# Patient Record
Sex: FEMALE | Race: BLACK OR AFRICAN AMERICAN | Employment: OTHER | URBAN - NONMETROPOLITAN AREA
[De-identification: names, ages, dates, MRNs, and addresses within clinical notes are randomized per-mention and may not be internally consistent; named-entity substitution may affect disease eponyms.]

---

## 2023-10-11 ENCOUNTER — HOSPITAL ENCOUNTER (OUTPATIENT)
Facility: HOSPITAL | Age: 73
Setting detail: RECURRING SERIES
Discharge: HOME | End: 2023-10-14
Payer: MEDICARE

## 2023-10-11 PROCEDURE — 97110 THERAPEUTIC EXERCISES: CPT

## 2023-10-11 PROCEDURE — 97161 PT EVAL LOW COMPLEX 20 MIN: CPT

## 2023-10-11 NOTE — THERAPY EVALUATION
Signature:_________________________   DATE:_________   TIME:________                           Griffin Blackwell Sr*    ** Signature, Date and Time must be completed for valid certification **  Please sign and fax to [phone].   Thank you      Patient Name:  Gino Bass :  1950

## 2023-10-16 ENCOUNTER — HOSPITAL ENCOUNTER (OUTPATIENT)
Facility: HOSPITAL | Age: 73
Setting detail: RECURRING SERIES
Discharge: HOME | End: 2023-10-19
Payer: MEDICARE

## 2023-10-16 PROCEDURE — G0283 ELEC STIM OTHER THAN WOUND: HCPCS

## 2023-10-16 PROCEDURE — 97110 THERAPEUTIC EXERCISES: CPT

## 2023-10-16 NOTE — PROGRESS NOTES
Other:  []w/US   []w/ice   [x]w/heat  Position: seated   Location:              min []  Mechanical Traction,        []  Cervical      []  Lumbar        []  Prone         []  Supine           []  Intermitt. []  Cont. Lbs:    pounds  [] With heat  [] Simultaneously performed with E-Stim    min []  Ultrasound,    []Continuous   [] Pulsed  []1MHz   []3MHz Location:  W/cm2:    min  Unbilled []  Ice     []  Heat             Position:  Location:            min []  Vasopneumatic Device,      pre-treatment girth :  cm   post-treatment girth : cm   measured at (landmark       location) :  Pressure:       [] lo [] med [] hi   Temperature: [] lo [] med [] Hi    [] With ice    [] Simultaneously performed with Estim     Skin assessment post-treatment (if applicable):    [x]  intact    [x]  redness- no adverse reaction []redness - adverse reaction:        [x] Patient Education billed concurrently with other procedures   [x] Review HEP    [] Progressed/Changed HEP, detail:    [] Other:       Other Objective/Functional Measures  Pt had increased discomfort today with shoulder retractions and was coached to ensure the scapulae stay depressed throughout the process    Pain Level at end of session (0-10 scale): 0/10    Assessment   Patient tolerated treatment well. She did have some increased numbness and tingling with certain exercises and required lots of verbal and occasional tactile cues in order to correct to proper technique. Pt reported excellent symptoms control after the treatment session. Continue POC progressing as able.   Patient will continue to benefit from skilled PT services to modify and progress therapeutic interventions, analyze and address functional mobility deficits, analyze and address ROM deficits, analyze and address strength deficits, analyze and address soft tissue restrictions, analyze and cue for proper movement patterns, and analyze and modify for postural abnormalities to address functional

## 2023-10-19 ENCOUNTER — HOSPITAL ENCOUNTER (OUTPATIENT)
Facility: HOSPITAL | Age: 73
Setting detail: RECURRING SERIES
Discharge: HOME | End: 2023-10-22
Payer: MEDICARE

## 2023-10-19 PROCEDURE — G0283 ELEC STIM OTHER THAN WOUND: HCPCS

## 2023-10-19 PROCEDURE — 97110 THERAPEUTIC EXERCISES: CPT

## 2023-10-19 NOTE — PROGRESS NOTES
to be met within 6 treatments:  Patient will demonstrate independence and compliance with HEP in order to increase mobility and assist with carryover from PT services. Status at last Eval/Progress Note: HEP  Current Status: provided HEP  Goal Met:  Yes     2. Patient will be able to sleep at least 4 hours at night with less shoulder pain of  5/10. Status at last Eval/Progress Note: Intermittent pain  Current Status: Pt continues to report pain levels at 5/10  Goal Met:  No      Long Term Goals, to be met within 12 treatments: 1. Patient will be able to perform daily activities with decrease numbness and tingling sensation of the left UE. Status at last Eval/Progress Note: intermittent  Current Status: pt continues the same severity of numbness and tingling  Goal Met:  No     2. Patient will be able to sleep at least 6 hours at night with less shoulder pain of  3/10.   Status at last Eval/Progress Note: Intermittent pain  Current Status: shoulder pain currently at 5/10  Goal Met:  No     []  See Progress Note/Recertification  []  See Discharge Summary    PLAN  [x]  Continue plan of care  [x]  Upgrade activities as tolerated  []  Discharge due to :  []  Other:      Fátima Allen, PT,        10/19/2023       9:45 AM

## 2023-10-24 ENCOUNTER — APPOINTMENT (OUTPATIENT)
Facility: HOSPITAL | Age: 73
End: 2023-10-24
Payer: MEDICARE

## 2023-10-26 ENCOUNTER — APPOINTMENT (OUTPATIENT)
Facility: HOSPITAL | Age: 73
End: 2023-10-26
Payer: MEDICARE

## 2023-11-03 ENCOUNTER — HOSPITAL ENCOUNTER (OUTPATIENT)
Facility: HOSPITAL | Age: 73
Setting detail: RECURRING SERIES
Discharge: HOME | End: 2023-11-06
Payer: MEDICARE

## 2023-11-03 PROCEDURE — 97110 THERAPEUTIC EXERCISES: CPT

## 2023-11-03 PROCEDURE — G0283 ELEC STIM OTHER THAN WOUND: HCPCS

## 2023-11-03 NOTE — PROGRESS NOTES
PHYSICAL THERAPY - MEDICARE DAILY TREATMENT NOTE (updated 3/23)    Date of Service: 11/3/2023        Patient Name:  Tigist Dykes :  1950   Medical   Diagnosis:  Spinal stenosis, cervical region [M48.02] Treatment Diagnosis:  M54.2  NECK PAIN    Referral Source: Luis Sharma Insurance:   Payor: MEDICARE / Plan: MEDICARE PART A AND B / Product Type: *No Product type* /    [x] Patient's date of birth verified                      Visit #   Current  / Total 4 10   Time   In / Out 1055 1155   Total Treatment Time (in minutes) 60    Total Timed Codes (in minutes) 50    1:1 Treatment Time (in minutes) 48       [facility] Totals Reminder:  bill using total billable   min of TIMED therapeutic procedures and modalities. 8-22 min = 1 unit; 23-37 min = 2 units; 38-52 min = 3 units; 53-67 min = 4 units; 68-82 min = 5 units        SUBJECTIVE  Pain Level (0-10 scale): 3/10    Any medication changes, allergies to medications, adverse drug reactions, diagnosis change, or new procedure performed?: [x] No    [] Yes (see summary sheet for update)  Medications: [x] Verified on Patient Summary List    Subjective functional status/changes:     \"I am alright today. \"    OBJECTIVE  Therapeutic Procedures: Tx Min Billable or 1:1 Min (if diff from Tx Min) Procedure, Rationale, Specifics   50  08476 Therapeutic Exercise (timed):  increase ROM, strength, coordination, balance, and proprioception to improve patient's ability to progress to PLOF and address remaining functional goals. (see flow sheet as applicable)   50     Total Total     Modalities Rationale:     decrease pain and increase tissue extensibility to improve patient's ability to progress to PLOF and address remaining functional goals.     10   min [x] Estim Unattended,             []  NMES  [] PreMod       [x]  IFC  [] Other:  []w/US   []w/ice   [x]w/heat  Position: seated  Location: neck R side            min []  Mechanical Traction,        []  Cervical

## 2023-11-08 ENCOUNTER — HOSPITAL ENCOUNTER (OUTPATIENT)
Facility: HOSPITAL | Age: 73
Setting detail: RECURRING SERIES
Discharge: HOME | End: 2023-11-11
Payer: MEDICARE

## 2023-11-08 PROCEDURE — 97110 THERAPEUTIC EXERCISES: CPT

## 2023-11-08 PROCEDURE — G0283 ELEC STIM OTHER THAN WOUND: HCPCS

## 2023-11-08 NOTE — PROGRESS NOTES
PHYSICAL THERAPY - MEDICARE DAILY TREATMENT NOTE (updated 3/23)    Date of Service: 2023        Patient Name:  Jez Savage :  1950   Medical   Diagnosis:  Spinal stenosis, cervical region [M48.02] Treatment Diagnosis:  M54.2, G89.29  CHRONIC NECK PAIN    Referral Source: [id number]:   Payor: MEDICARE / Plan: MEDICARE PART A AND B / Product Type: *No Product type* /    [x] Patient's date of birth verified                      Visit #   Current  / Total 5 12   Time   In / Out 10:00 10:45   Total Treatment Time (in minutes) 45    Total Timed Codes (in minutes) 30    1:1 Treatment Time (in minutes) 30       [facility] Totals Reminder:  bill using total billable   min of TIMED therapeutic procedures and modalities. 8-22 min = 1 unit; 23-37 min = 2 units; 38-52 min = 3 units; 53-67 min = 4 units; 68-82 min = 5 units        SUBJECTIVE  Pain Level (0-10 scale): 0/10    Any medication changes, allergies to medications, adverse drug reactions, diagnosis change, or new procedure performed?: [x] No    [] Yes (see summary sheet for update)  Medications: [x] Verified on Patient Summary List    Subjective functional status/changes:     \"I have been doing the neck stretches since I have been gone. Continue to get numbness and tingling sensation of both arms based on position. \"    OBJECTIVE  Therapeutic Procedures: Tx Min Billable or 1:1 Min (if diff from Tx Min) Procedure, Rationale, Specifics   30 30 21369 Therapeutic Exercise (timed):  increase ROM, strength, coordination, balance, and proprioception to improve patient's ability to progress to PLOF and address remaining functional goals. (see flow sheet as applicable)                   30 30    Total Total     Modalities Rationale:     decrease inflammation, decrease pain, and increase tissue extensibility to improve patient's ability to progress to PLOF and address remaining functional goals.     15   min [x] Estim Unattended,

## 2023-11-10 ENCOUNTER — HOSPITAL ENCOUNTER (OUTPATIENT)
Facility: HOSPITAL | Age: 73
Setting detail: RECURRING SERIES
Discharge: HOME | End: 2023-11-13
Payer: MEDICARE

## 2023-11-10 PROCEDURE — 97110 THERAPEUTIC EXERCISES: CPT

## 2023-11-10 PROCEDURE — 97150 GROUP THERAPEUTIC PROCEDURES: CPT

## 2023-11-10 NOTE — PROGRESS NOTES
PHYSICAL THERAPY - MEDICARE DAILY TREATMENT NOTE (updated 3/23)    Date of Service: 11/10/2023        Patient Name:  Tami Zavaleta :  1950   Medical   Diagnosis:  Spinal stenosis, cervical region [M48.02] Treatment Diagnosis:  M54.2  NECK PAIN    Referral Source: Kellen,  [address]:   Payor: MEDICARE / Plan: MEDICARE PART A AND B / Product Type: *No Product type* /    [x] Patient's date of birth verified                      Visit #   Current  / Total 6 10   Time   In / Out 1033 1116   Total Treatment Time (in minutes) 43    Total Timed Codes (in minutes) 21    1:1 Treatment Time (in minutes) 21       [facility] Totals Reminder:  bill using total billable   min of TIMED therapeutic procedures and modalities. 8-22 min = 1 unit; 23-37 min = 2 units; 38-52 min = 3 units; 53-67 min = 4 units; 68-82 min = 5 units        SUBJECTIVE  Pain Level (0-10 scale): 0/10    Any medication changes, allergies to medications, adverse drug reactions, diagnosis change, or new procedure performed?: [x] No    [] Yes (see summary sheet for update)  Medications: [x] Verified on Patient Summary List    Subjective functional status/changes:     \"I am making progress. \"    OBJECTIVE  Therapeutic Procedures: Tx Min Billable or 1:1 Min (if diff from Tx Min) Procedure, Rationale, Specifics   21 21 89030 Therapeutic Exercise (timed):  increase ROM, strength, coordination, balance, and proprioception to improve patient's ability to progress to PLOF and address remaining functional goals. (see flow sheet as applicable)   19524 Group Therapy (untimed): Billed while completing therapeutic exercise during beginning of treatment session to improve patient's ability to progress to PLOF and address remaining functional goals.   (see flow sheet as applicable)   43 21    Total Total       [x] Patient Education billed concurrently with other procedures   [x] Review HEP    [] Progressed/Changed HEP, detail:    [] Other:
HEP  Current Status: provided HEP  Goal Met:  Yes     2. Patient will be able to sleep at least 4 hours at night with less shoulder pain of  5/10. Status at last Eval/Progress Note: Intermittent pain  Current Status: 3/10 up to x2/wk   Goal Met:  yes      Long Term Goals, to be met within 12 treatments: 1. Patient will be able to perform daily activities with decrease numbness and tingling sensation of the left UE. Status at last Eval/Progress Note: intermittent  Current Status: no numbness or tingling  Goal Met:  Yes     2. Patient will be able to sleep at least 6 hours at night with less shoulder pain of  3/10. Status at last Eval/Progress Note: Intermittent pain  Current Status: 3/10 x2/wk   Goal Met:  In Progress    3. Patient will be able to reach into the upper cabinets to put away the dishes with less than 2/10 pain. Status at last Eval/Progress Note: avoiding tasks  Current Status: new goal 11/10/2023  Goal Met: No        Frequency/Duration:  2 treatments per week, for 10 treatments. RECOMMENDATIONS  [x]  Continue plan of care  [x]  Upgrade activities as tolerated    Certification Period:  11/10/2023 to 01/08/2024      Abelino Reno PT, DPT       11/10/2023       1:22 PM    ________________________________________________________________________  NOTE TO PHYSICIAN:  Please complete the following and fax to:  Located within [facility]:   Fax: [phone] this original for your records. If you are unable to process this request in 24 hours, please contact our office.        ____ I have read the above report and request that my patient continue therapy with the following changes/special instructions:  ____ I have read the above report and request that my patient be discharged from therapy    Physician's Signature:______________________________________ Date:___________Time:__________                              Rojas Kate    ** Signature, Date and Time must be

## 2024-10-07 ENCOUNTER — PROCEDURE VISIT (OUTPATIENT)
Age: 74
End: 2024-10-07
Payer: MEDICARE

## 2024-10-07 ENCOUNTER — OFFICE VISIT (OUTPATIENT)
Age: 74
End: 2024-10-07
Payer: MEDICARE

## 2024-10-07 VITALS
HEART RATE: 77 BPM | RESPIRATION RATE: 16 BRPM | WEIGHT: 216 LBS | OXYGEN SATURATION: 98 % | HEIGHT: 63 IN | BODY MASS INDEX: 38.27 KG/M2 | DIASTOLIC BLOOD PRESSURE: 68 MMHG | SYSTOLIC BLOOD PRESSURE: 104 MMHG

## 2024-10-07 DIAGNOSIS — H93.13 BILATERAL TINNITUS: ICD-10-CM

## 2024-10-07 DIAGNOSIS — H90.3 SENSORINEURAL HEARING LOSS (SNHL), BILATERAL: ICD-10-CM

## 2024-10-07 DIAGNOSIS — H90.3 SENSORINEURAL HEARING LOSS (SNHL), BILATERAL: Primary | ICD-10-CM

## 2024-10-07 DIAGNOSIS — H91.93 DECREASED HEARING, BILATERAL: Primary | ICD-10-CM

## 2024-10-07 DIAGNOSIS — H93.13 TINNITUS, BILATERAL: ICD-10-CM

## 2024-10-07 PROCEDURE — 99203 OFFICE O/P NEW LOW 30 MIN: CPT | Performed by: OTOLARYNGOLOGY

## 2024-10-07 PROCEDURE — 3074F SYST BP LT 130 MM HG: CPT | Performed by: OTOLARYNGOLOGY

## 2024-10-07 PROCEDURE — 3078F DIAST BP <80 MM HG: CPT | Performed by: OTOLARYNGOLOGY

## 2024-10-07 PROCEDURE — 92557 COMPREHENSIVE HEARING TEST: CPT

## 2024-10-07 PROCEDURE — 92567 TYMPANOMETRY: CPT

## 2024-10-07 PROCEDURE — 1123F ACP DISCUSS/DSCN MKR DOCD: CPT | Performed by: OTOLARYNGOLOGY

## 2024-10-07 RX ORDER — CARVEDILOL 6.25 MG/1
TABLET ORAL
COMMUNITY
Start: 2023-12-06

## 2024-10-07 RX ORDER — MEMANTINE HYDROCHLORIDE 10 MG/1
TABLET ORAL
COMMUNITY
Start: 2023-11-13

## 2024-10-07 RX ORDER — LISINOPRIL 2.5 MG/1
TABLET ORAL
COMMUNITY
Start: 2023-12-06

## 2024-10-07 RX ORDER — SPIRONOLACTONE 25 MG/1
TABLET ORAL
COMMUNITY
Start: 2023-12-06

## 2024-10-07 RX ORDER — ATORVASTATIN CALCIUM 20 MG/1
TABLET, FILM COATED ORAL
COMMUNITY

## 2024-10-07 RX ORDER — FUROSEMIDE 40 MG
TABLET ORAL
COMMUNITY

## 2024-10-07 NOTE — PROGRESS NOTES
- XI intact. Normal gait        Assessment and Plan:   Bilateral sensorineural hearing loss  Bilateral tinnitus  -Reviewed audiogram results with patient showing mild high-frequency sensorineural hearing loss  -Discussed tinnitus and management strategies  -Discussed role of hearing loss and tinnitus  -Discussed role of hearing aids for tinnitus and hearing loss management  -She will consider the above  -Would otherwise update audiogram in 1 to 2 years or pending her symptoms        The patient was instructed to return to clinic if no improvement or progression of symptoms. Signs to watch out for reviewed.      Mary Daniel MD   [facility]  [address] [place] [address] [address]  [place] [address]  Office Phone: [phone]

## 2024-10-07 NOTE — PROGRESS NOTES
Becka Kirk   1950, 74 y.o. female   [id number]       Referring Provider: Mary Daniel MD  Referral Type: In an order in Epic    Reason for Visit: Evaluation of the cause of disorders of hearing, tinnitus, or balance.    ADULT AUDIOLOGIC EVALUATION      Becka Kirk is a 74 y.o. female seen today, 10/7/2024 , for an initial audiologic evaluation.  Patient was seen by Mary Daniel MD following today's evaluation.    AUDIOLOGIC AND OTHER PERTINENT MEDICAL HISTORY:      Becka Kirk reports bilateral tinnitus. Reports bilateral tinnitus, onsetting gradually over the past 2 months. She notes no hearing difficulties today.    She denied otalgia, aural fullness, otorrhea, dizziness, imbalance, history of noise exposure, and family history of hearing loss    Date: 10/7/2024     IMPRESSIONS:      Today's results revealed normal sloping to mild sensorineural hearing loss. Excellent speech understanding when in quiet. Tympanometry indicates normal middle ear function. Discussed test results and implications with patient. Discussed possible benefits of amplification.  Discussed use of tinnitus management strategies. Provided basic tinnitus education, including the neurophysiological model.    Follow medical recommendations of Mary Daniel MD.     ASSESSMENT AND FINDINGS:     Otoscopy unremarkable.    RIGHT EAR:  Hearing Sensitivity: Normal sloping to mild sensorineural hearing loss  Speech Recognition Threshold: 25 dB HL  Word Recognition: Excellent 100%, based on NU-6 by-difficulty list at 65 dBHL using recorded speech stimuli.    Tympanometry: Normal peak pressure and compliance, Type A tympanogram, consistent with normal middle ear function.       LEFT EAR:  Hearing Sensitivity: Normal sloping to mild sensorineural hearing loss  Speech Recognition Threshold: 20 dB HL  Word Recognition: Excellent 100%, based on NU-6 by-difficulty list at 65 dBHL using recorded speech stimuli.    Tympanometry: Normal peak

## 2024-10-16 ENCOUNTER — APPOINTMENT (OUTPATIENT)
Facility: HOSPITAL | Age: 74
End: 2024-10-16
Payer: MEDICARE

## 2024-10-21 ENCOUNTER — HOSPITAL ENCOUNTER (OUTPATIENT)
Facility: HOSPITAL | Age: 74
Setting detail: RECURRING SERIES
Discharge: HOME | End: 2024-10-24
Payer: MEDICARE

## 2024-10-21 PROCEDURE — 97161 PT EVAL LOW COMPLEX 20 MIN: CPT

## 2024-10-21 NOTE — THERAPY EVALUATION
[facility]  [address] [place] [id number]  Ph: [phone]     Fax: [phone]         PHYSICAL THERAPY - MEDICARE EVALUATION/PLAN OF CARE NOTE (updated 3/23)      Date of Service: 10/21/2024          Patient Name:  Becka Kirk :  1950   Medical   Diagnosis:  Spinal stenosis, cervical region [M48.02] Treatment Diagnosis:  M54.2  NECK PAIN    Referral Source:  Be Foreman * Provider #:  [id number]                Insurance: Payor: MEDICARE / Plan: MEDICARE PART A AND B / Product Type: *No Product type* /    [x] Patient's date of birth verified      Visit #   Current  / Total 1    Time   In / Out 1335 1400   Total Treatment Time 25 minutes   Total Timed Codes 0 minutes   1:1 Treatment Time 0 minutes      [facility] Totals Reminder:  bill using total billable   min of TIMED therapeutic procedures and modalities.   8-22 min = 1 unit; 23-37 min = 2 units; 38-52 min = 3 units;  53-67 min = 4 units; 68-82 min = 5 units       SUBJECTIVE  Pain Level (0-10 scale): 6/10  Changes in pain since onset: Intermittent    Any medication changes, allergies to medications, adverse drug reactions, diagnosis change, or new procedure performed?: [x] No    [] Yes (see summary sheet for update)  Medications: Verified on Patient Summary List    Subjective functional status/changes:     Patient is 74 y.o. -American female who presents for physical therapy evaluation with neck pain that has been present for ~ 2 weeks. She reports that she just woke up with pain that radiates into her LUE. She has tingling into the arm, but pain stays at the neck. She has difficulty going to sleep, but stays away.     Onset Date: 2 weeks ago  Start of Care: 10/21/2024  PLOF: Independent with all ADL's   Mechanism of Injury: Insidious onset  Previous Treatment/Compliance: Outpatient   Radiographs: None taken  What increases symptoms: reaching, lifting  What decreases symptoms: sitting

## 2024-10-23 ENCOUNTER — HOSPITAL ENCOUNTER (OUTPATIENT)
Facility: HOSPITAL | Age: 74
Setting detail: RECURRING SERIES
Discharge: HOME | End: 2024-10-26
Payer: MEDICARE

## 2024-10-23 PROCEDURE — 97110 THERAPEUTIC EXERCISES: CPT

## 2024-10-23 NOTE — PROGRESS NOTES
[]  Lumbar        []  Prone         []  Supine           []  Intermitt.     []  Cont.     Lbs:    pounds  [] With heat  [] Simultaneously performed with E-Stim    min []  Ultrasound,    []Continuous   [] Pulsed  []1MHz   []3MHz Location:  W/cm2:   10 min  Unbilled []  Ice     [x]  Heat             Position: seated  Location: neck            min []  Vasopneumatic Device,      pre-treatment girth :  cm   post-treatment girth : cm   measured at (landmark       location) :  Pressure:       [] lo [] med [] hi   Temperature: [] lo [] med [] Hi    [] With ice    [] Simultaneously performed with Estim     Skin assessment post-treatment (if applicable):    [x]  intact    [x]  redness- no adverse reaction []redness - adverse reaction:        Pain Level at end of session (0-10 scale): 1/10    Assessment   Patient tolerated treatment fair. Pt was able to complete all asked exercises and stretches today without c/o increased neck pain. However, pt c/o more sharp hip pain than anything. Patient will continue to benefit from skilled PT services to modify and progress therapeutic interventions, analyze and address functional mobility deficits, analyze and address ROM deficits, and analyze and address strength deficits to address functional deficits and attain remaining goals.    PRECAUTIONS:  History of Cancer        Date of Initial Evaluation: 10/21/2024  Date of last Progress Note: n/a  Visit # of last Progress Note: 1      Number of Insurance Authorized visits: Not Applicable            Therapeutic Exercise Flow Sheet:                                                                                    Running Visit #    EXERCISE   1   2   3   4   5   6   7   8   9   10      UBE    bkwd  Lvl 2 x12'                            Cervical AROM          x10                         Upper Trap Stretch          30\"X3                         LS Stretch          30\"X3                         Chin Tucks          5\"x10

## 2024-10-28 ENCOUNTER — HOSPITAL ENCOUNTER (OUTPATIENT)
Facility: HOSPITAL | Age: 74
Setting detail: RECURRING SERIES
Discharge: HOME | End: 2024-10-31
Payer: MEDICARE

## 2024-10-28 PROCEDURE — 97110 THERAPEUTIC EXERCISES: CPT

## 2024-10-28 NOTE — PROGRESS NOTES
PHYSICAL THERAPY - MEDICARE DAILY TREATMENT NOTE (updated 3/23)    Date of Service: 10/28/2024        Patient Name:  Becka Kirk :  1950   Medical   Diagnosis:  Spinal stenosis, cervical region [M48.02] Treatment Diagnosis:  M54.2  NECK PAIN    Referral Source:  Be Foreman Sr* Insurance:   Payor: MEDICARE / Plan: MEDICARE PART A AND B / Product Type: *No Product type* /    [x] Patient's date of birth verified                      Visit #   Current  / Total 3 10   Time   In / Out 1007 1008   Total Treatment Time (in minutes) 61   Total Timed Codes (in minutes) 51   1:1 Treatment Time (in minutes) 51      [facility] Totals Reminder:  bill using total billable   min of TIMED therapeutic procedures and modalities.   8-22 min = 1 unit; 23-37 min = 2 units; 38-52 min = 3 units; 53-67 min = 4 units; 68-82 min = 5 units        SUBJECTIVE  Pain Level (0-10 scale): 0/10    Any medication changes, allergies to medications, adverse drug reactions, diagnosis change, or new procedure performed?: No  Medications: [x] Verified on Patient Summary List    Subjective functional status/changes:     \"The frequency of the pain is lessening since I started physical therapy.\"    OBJECTIVE  Therapeutic Procedures:  Tx Min Billable or 1:1 Min (if diff from Tx Min) Procedure, Rationale, Specifics   51  00677 Therapeutic Exercise (timed):  increase ROM, strength, coordination, balance, and proprioception to improve patient's ability to progress to PLOF and address remaining functional goals. (see flow sheet as applicable)   51     Total Total   [x] Patient Education billed concurrently with other procedures     Modalities Rationale:     decrease pain and increase tissue extensibility to improve patient's ability to progress to PLOF and address remaining functional goals.       min [] Estim Unattended,             []  NMES  [] PreMod       []  IFC  [] Other:  []w/US   []w/ice   []w/heat  Position:  Location:            min []

## 2024-10-30 ENCOUNTER — HOSPITAL ENCOUNTER (OUTPATIENT)
Facility: HOSPITAL | Age: 74
Setting detail: RECURRING SERIES
Discharge: HOME | End: 2024-11-02
Payer: MEDICARE

## 2024-10-30 PROCEDURE — 97110 THERAPEUTIC EXERCISES: CPT

## 2024-10-30 NOTE — PROGRESS NOTES
Lumbar        []  Prone         []  Supine           []  Intermitt.     []  Cont.     Lbs:    pounds  [] With heat  [] Simultaneously performed with E-Stim    min []  Ultrasound,    []Continuous   [] Pulsed  []1MHz   []3MHz Location:  W/cm2:   10 min  Unbilled []  Ice     [x]  Heat             Position: seated  Location: neck            min []  Vasopneumatic Device,      pre-treatment girth :  cm   post-treatment girth : cm   measured at (landmark       location) :  Pressure:       [] lo [] med [] hi   Temperature: [] lo [] med [] Hi    [] With ice    [] Simultaneously performed with Estim     Skin assessment post-treatment (if applicable):    [x]  intact    [x]  redness- no adverse reaction []redness - adverse reaction:        Pain Level at end of session (0-10 scale): 0/10    Assessment   Patient tolerated treatment well today. She did need some coaching to perform LS stretch correctly, but benefited well from the stretch. We will progress to some more strengthening in upcoming sessions as she tolerates. Patient will continue to benefit from skilled PT services to modify and progress therapeutic interventions, analyze and address functional mobility deficits, analyze and address ROM deficits, and analyze and address strength deficits to address functional deficits and attain remaining goals.    PRECAUTIONS:  History of Cancer        Date of Initial Evaluation: 10/21/2024  Date of last Progress Note: n/a  Visit # of last Progress Note: 1      Number of Insurance Authorized visits: Not Applicable            Therapeutic Exercise Flow Sheet:                                                                                    Running Visit #    EXERCISE   1   2   3   4   5   6   7   8   9   10      UBE    bkwd  Lvl 2 x12'      Lvl 2  10min    Lvl 2 x12'                   Cervical AROM          x10    x20     x20                   Upper Trap Stretch          30\"X3    30\"X3       30\"X3                    LS Stretch

## 2024-11-04 ENCOUNTER — HOSPITAL ENCOUNTER (OUTPATIENT)
Facility: HOSPITAL | Age: 74
Setting detail: RECURRING SERIES
End: 2024-11-04
Payer: MEDICARE

## 2024-11-06 ENCOUNTER — HOSPITAL ENCOUNTER (OUTPATIENT)
Facility: HOSPITAL | Age: 74
Setting detail: RECURRING SERIES
Discharge: HOME | End: 2024-11-09
Payer: MEDICARE

## 2024-11-06 PROCEDURE — 97110 THERAPEUTIC EXERCISES: CPT

## 2024-11-06 PROCEDURE — 97012 MECHANICAL TRACTION THERAPY: CPT

## 2024-11-06 PROCEDURE — 97150 GROUP THERAPEUTIC PROCEDURES: CPT

## 2024-11-06 NOTE — PROGRESS NOTES
PHYSICAL THERAPY - MEDICARE DAILY TREATMENT NOTE (updated 3/23)    Date of Service: 2024        Patient Name:  Becka Kirk :  1950   Medical   Diagnosis:  Spinal stenosis, cervical region [M48.02] Treatment Diagnosis:  M54.2  NECK PAIN    Referral Source:  Be Foreman Sr* Insurance:   Payor: MEDICARE / Plan: MEDICARE PART A AND B / Product Type: *No Product type* /    [x] Patient's date of birth verified                      Visit #   Current  / Total 5 10   Time   In / Out 1031 1133   Total Treatment Time (in minutes) 62    Total Timed Codes (in minutes) 34    1:1 Treatment Time (in minutes) 34       [facility] Totals Reminder:  bill using total billable   min of TIMED therapeutic procedures and modalities.   8-22 min = 1 unit; 23-37 min = 2 units; 38-52 min = 3 units; 53-67 min = 4 units; 68-82 min = 5 units        SUBJECTIVE  Pain Level (0-10 scale): 3/10    Any medication changes, allergies to medications, adverse drug reactions, diagnosis change, or new procedure performed?: No  Medications: [x] Verified on Patient Summary List    Subjective functional status/changes:     \"I've got tingling down my R arm to my thumb and index finger.\"    OBJECTIVE  Therapeutic Procedures:  Tx Min Billable or 1:1 Min (if diff from Tx Min) Procedure, Rationale, Specifics   34 34 [id number] Therapeutic Exercise (timed):  increase ROM, strength, coordination, balance, and proprioception to improve patient's ability to progress to PLOF and address remaining functional goals. (see flow sheet as applicable)   11 0 [id number] Group Therapy (untimed): Billed while completing therapeutic exercise during beginning of treatment session to improve patient's ability to progress to PLOF and address remaining functional goals.  (see flow sheet as applicable)   45 34    Total Total   [x] Patient Education billed concurrently with other procedures     Modalities Rationale:     decrease pain and increase tissue extensibility to

## 2024-11-13 ENCOUNTER — HOSPITAL ENCOUNTER (OUTPATIENT)
Facility: HOSPITAL | Age: 74
Setting detail: RECURRING SERIES
Discharge: HOME | End: 2024-11-16
Payer: MEDICARE

## 2024-11-13 PROCEDURE — G0283 ELEC STIM OTHER THAN WOUND: HCPCS

## 2024-11-13 PROCEDURE — 97012 MECHANICAL TRACTION THERAPY: CPT

## 2024-11-13 PROCEDURE — 97150 GROUP THERAPEUTIC PROCEDURES: CPT

## 2024-11-13 NOTE — PROGRESS NOTES
PHYSICAL THERAPY - MEDICARE DAILY TREATMENT NOTE (updated 3/23)    Date of Service: 2024        Patient Name:  Becka Kirk :  1950   Medical   Diagnosis:  Spinal stenosis, cervical region [M48.02] Treatment Diagnosis:  M54.2  NECK PAIN    Referral Source:  Be Foreman Sr* Insurance:   Payor: MEDICARE / Plan: MEDICARE PART A AND B / Product Type: *No Product type* /    [x] Patient's date of birth verified                      Visit #   Current  / Total 6 10   Time   In / Out 1001 am 1059 am    Total Treatment Time (in minutes) 58    Total Timed Codes (in minutes) 40    1:1 Treatment Time (in minutes) 0       [facility] Totals Reminder:  bill using total billable   min of TIMED therapeutic procedures and modalities.   8-22 min = 1 unit; 23-37 min = 2 units; 38-52 min = 3 units; 53-67 min = 4 units; 68-82 min = 5 units        SUBJECTIVE  Pain Level (0-10 scale): 2/10    Any medication changes, allergies to medications, adverse drug reactions, diagnosis change, or new procedure performed?: No  Medications: [x] Verified on Patient Summary List    Subjective functional status/changes:     \"Pt reports some numbness and tingling in her L hand , fingers and thumb.\"    OBJECTIVE  Therapeutic Procedures:  Tx Min Billable or 1:1 Min (if diff from Tx Min) Procedure, Rationale, Specifics     08977 Therapeutic Exercise (timed):  increase ROM, strength, coordination, balance, and proprioception to improve patient's ability to progress to PLOF and address remaining functional goals. (see flow sheet as applicable)   40 0 [id number] Group Therapy (untimed): Billed while completing therapeutic exercise during beginning of treatment session to improve patient's ability to progress to PLOF and address remaining functional goals.  (see flow sheet as applicable)   40 0    Total Total   [x] Patient Education billed concurrently with other procedures     Modalities Rationale:     decrease pain, increase tissue

## 2024-11-15 ENCOUNTER — HOSPITAL ENCOUNTER (OUTPATIENT)
Facility: HOSPITAL | Age: 74
Setting detail: RECURRING SERIES
Discharge: HOME | End: 2024-11-18
Payer: MEDICARE

## 2024-11-15 PROCEDURE — 97012 MECHANICAL TRACTION THERAPY: CPT

## 2024-11-15 PROCEDURE — 97110 THERAPEUTIC EXERCISES: CPT

## 2024-11-15 PROCEDURE — 97150 GROUP THERAPEUTIC PROCEDURES: CPT

## 2024-11-15 NOTE — PROGRESS NOTES
PHYSICAL THERAPY - MEDICARE DAILY TREATMENT NOTE (updated 3/23)    Date of Service: 11/15/2024        Patient Name:  Becka Kirk :  1950   Medical   Diagnosis:  Spinal stenosis, cervical region [M48.02] Treatment Diagnosis:  M54.2  NECK PAIN    Referral Source:  Be Foreman Sr* Insurance:   Payor: MEDICARE / Plan: MEDICARE PART A AND B / Product Type: *No Product type* /    [x] Patient's date of birth verified                      Visit #   Current  / Total 7 10   Time   In / Out 1025 am 1130 am   Total Treatment Time (in minutes) 65    Total Timed Codes (in minutes) 47    1:1 Treatment Time (in minutes) 37       [facility] Totals Reminder:  bill using total billable   min of TIMED therapeutic procedures and modalities.   8-22 min = 1 unit; 23-37 min = 2 units; 38-52 min = 3 units; 53-67 min = 4 units; 68-82 min = 5 units        SUBJECTIVE  Pain Level (0-10 scale): 7/10    Any medication changes, allergies to medications, adverse drug reactions, diagnosis change, or new procedure performed?: No  Medications: [x] Verified on Patient Summary List    Subjective functional status/changes:     \"Pt reports doing much better the traction seems to be helping.\"    OBJECTIVE  Therapeutic Procedures:  Tx Min Billable or 1:1 Min (if diff from Tx Min) Procedure, Rationale, Specifics   37 [phone] [id number] Therapeutic Exercise (timed):  increase ROM, strength, coordination, balance, and proprioception to improve patient's ability to progress to PLOF and address remaining functional goals. (see flow sheet as applicable)   10 0 10887 Group Therapy (untimed): Billed while completing therapeutic exercise during end of treatment session to improve patient's ability to progress to PLOF and address remaining functional goals.  (see flow sheet as applicable)   47 37    Total Total   [x] Patient Education billed concurrently with other procedures     Modalities Rationale:     decrease pain, increase tissue extensibility, and

## 2024-11-18 ENCOUNTER — HOSPITAL ENCOUNTER (OUTPATIENT)
Facility: HOSPITAL | Age: 74
Setting detail: RECURRING SERIES
Discharge: HOME | End: 2024-11-21
Payer: MEDICARE

## 2024-11-18 PROCEDURE — 97012 MECHANICAL TRACTION THERAPY: CPT

## 2024-11-18 PROCEDURE — 97110 THERAPEUTIC EXERCISES: CPT

## 2024-11-18 NOTE — PROGRESS NOTES
[facility]  [address] [place] [id number]  Ph: [phone]     Fax: [phone]    CONTINUED PLAN OF CARE/RECERTIFICATION FOR PHYSICAL THERAPY          Patient Name:              Becka Kirk :  1950   Treatment/Medical Diagnosis:  Spinal stenosis, cervical region [M48.02]   Onset Date:  10/2024    Referral Source:  Be Foreman Sr* Start of Care (SOC):  10/*   Prior Hospitalization:  See Medical History Provider #:  NPI      Prior Level of Function (PLOF):  independent   Comorbidities:  See Medical History   Medications:  Verified on Patient Summary List   Visits from SOC:  8 Missed Visits:  1       Summary of Care / Assessment / Recommendations:   Patient tolerated treatment well so far addressing neck and UE pain. She is still having some radicular symptoms into the LUE which does improve with use of traction to the cervical spine. She gets a couple days relief from traction, so we will plan to increase days between treatment sessions to try and increase carryover between sessions. She has demonstrated improvements in ROM and strength at this time. Patient will continue to benefit from skilled PT services x2/ week to modify and progress therapeutic interventions, analyze and address functional mobility deficits, analyze and address ROM deficits, analyze and address strength deficits, and analyze and address soft tissue restrictions to address functional deficits and attain remaining goals.      Progress Toward Goals:   Short Term Goals, to be met within 5 treatments:  Patient will demonstrate independence and compliance with HEP in order to increase mobility and assist with carryover from PT services.  Status at last Eval/Progress Note: provided  Current Status: performing  Goal Met?  Yes     2.  Patient will improve cervical ROT to 60 deg B to be able to take in her surrounds with less limitations.   Status at last Eval/Progress Note:

## 2024-11-18 NOTE — PROGRESS NOTES
PHYSICAL THERAPY - MEDICARE DAILY TREATMENT NOTE (updated 3/23)    Date of Service: 2024        Patient Name:  Becka Kirk :  1950   Medical   Diagnosis:  Spinal stenosis, cervical region [M48.02] Treatment Diagnosis:  M54.2  NECK PAIN    Referral Source:  Be Foreman Sr* Insurance:   Payor: MEDICARE / Plan: MEDICARE PART A AND B / Product Type: *No Product type* /    [x] Patient's date of birth verified                      Visit #   Current  / Total 8 10   Time   In / Out 1000 1100   Total Treatment Time (in minutes) 60    Total Timed Codes (in minutes) 40   1:1 Treatment Time (in minutes) 40       [facility] Totals Reminder:  bill using total billable   min of TIMED therapeutic procedures and modalities.   8-22 min = 1 unit; 23-37 min = 2 units; 38-52 min = 3 units; 53-67 min = 4 units; 68-82 min = 5 units        SUBJECTIVE  Pain Level (0-10 scale): 3/10    Any medication changes, allergies to medications, adverse drug reactions, diagnosis change, or new procedure performed?: No  Medications: [x] Verified on Patient Summary List    Subjective functional status/changes:     \"I still have some pain and numbness down to my first few fingers.\"    OBJECTIVE  Therapeutic Procedures:  Tx Min Billable or 1:1 Min (if diff from Tx Min) Procedure, Rationale, Specifics   40  [id number] Therapeutic Exercise (timed):  increase ROM, strength, coordination, balance, and proprioception to improve patient's ability to progress to PLOF and address remaining functional goals. (see flow sheet as applicable)   40     Total Total   [x] Patient Education billed concurrently with other procedures     Modalities Rationale:     decrease pain, increase tissue extensibility, and increase muscle contraction/control to improve patient's ability to progress to PLOF and address remaining functional goals.       min [] Estim Unattended,             []  NMES  [] PreMod       []  IFC  [] Other:  []w/US   []w/ice

## 2024-12-13 ENCOUNTER — TELEPHONE (OUTPATIENT)
Facility: HOSPITAL | Age: 74
End: 2024-12-13

## 2024-12-13 NOTE — TELEPHONE ENCOUNTER
Phone call made to patient 12/13/2024 at 2:01 PM to schedule follow-up treatment appointment secondary to lack of attendance. Patient was not contacted and voicemail was left requesting a return phone call.

## 2025-01-21 ENCOUNTER — HOSPITAL ENCOUNTER (OUTPATIENT)
Facility: HOSPITAL | Age: 75
Discharge: HOME | End: 2025-01-24
Payer: MEDICARE

## 2025-01-21 DIAGNOSIS — M54.2 NECK PAIN: ICD-10-CM

## 2025-01-21 PROCEDURE — 72141 MRI NECK SPINE W/O DYE: CPT
